# Patient Record
Sex: FEMALE | Race: BLACK OR AFRICAN AMERICAN | Employment: PART TIME
[De-identification: names, ages, dates, MRNs, and addresses within clinical notes are randomized per-mention and may not be internally consistent; named-entity substitution may affect disease eponyms.]

---
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
## 2023-07-10 ENCOUNTER — APPOINTMENT (OUTPATIENT)
Dept: GENERAL RADIOLOGY | Age: 18
End: 2023-07-10
Payer: COMMERCIAL

## 2023-07-10 ENCOUNTER — HOSPITAL ENCOUNTER (EMERGENCY)
Age: 18
Discharge: HOME | End: 2023-07-10
Attending: EMERGENCY MEDICINE
Payer: COMMERCIAL

## 2023-07-10 VITALS
OXYGEN SATURATION: 97 % | TEMPERATURE: 97.5 F | DIASTOLIC BLOOD PRESSURE: 81 MMHG | HEIGHT: 62 IN | HEART RATE: 81 BPM | BODY MASS INDEX: 29.13 KG/M2 | RESPIRATION RATE: 18 BRPM | WEIGHT: 158.29 LBS | SYSTOLIC BLOOD PRESSURE: 124 MMHG

## 2023-07-10 DIAGNOSIS — R42 DIZZINESS: Primary | ICD-10-CM

## 2023-07-10 LAB
ALBUMIN SERPL-MCNC: 4.5 G/DL (ref 3.5–5.2)
ALBUMIN/GLOB SERPL: 2 {RATIO} (ref 1–2.5)
ALP SERPL-CCNC: 82 U/L (ref 35–104)
ALT SERPL-CCNC: 19 U/L (ref 5–33)
ANION GAP SERPL CALCULATED.3IONS-SCNC: 12 MMOL/L (ref 9–17)
AST SERPL-CCNC: 18 U/L
BASOPHILS # BLD: 0.04 K/UL (ref 0–0.2)
BASOPHILS NFR BLD: 1 % (ref 0–2)
BILIRUB SERPL-MCNC: 0.6 MG/DL (ref 0.3–1.2)
BILIRUB UR QL STRIP: ABNORMAL
BUN SERPL-MCNC: 12 MG/DL (ref 6–20)
CALCIUM SERPL-MCNC: 9.7 MG/DL (ref 8.6–10.4)
CASTS #/AREA URNS LPF: NORMAL /LPF (ref 0–8)
CHLORIDE SERPL-SCNC: 102 MMOL/L (ref 98–107)
CLARITY UR: ABNORMAL
CO2 SERPL-SCNC: 22 MMOL/L (ref 20–31)
COLOR UR: ABNORMAL
CREAT SERPL-MCNC: 0.7 MG/DL (ref 0.5–0.9)
D DIMER PPP FEU-MCNC: 0.31 UG/ML FEU (ref 0–0.57)
EOSINOPHIL # BLD: 0.04 K/UL (ref 0–0.44)
EOSINOPHILS RELATIVE PERCENT: 1 % (ref 1–4)
EPI CELLS #/AREA URNS HPF: NORMAL /HPF (ref 0–5)
ERYTHROCYTE [DISTWIDTH] IN BLOOD BY AUTOMATED COUNT: 12.1 % (ref 11.8–14.4)
GFR SERPL CREATININE-BSD FRML MDRD: >60 ML/MIN/1.73M2
GLUCOSE SERPL-MCNC: 115 MG/DL (ref 70–99)
GLUCOSE UR STRIP-MCNC: NEGATIVE MG/DL
HCG SERPL QL: NEGATIVE
HCT VFR BLD AUTO: 38.9 % (ref 36.3–47.1)
HGB BLD-MCNC: 13.7 G/DL (ref 11.9–15.1)
HGB UR QL STRIP.AUTO: ABNORMAL
IMM GRANULOCYTES # BLD AUTO: 0.03 K/UL (ref 0–0.3)
IMM GRANULOCYTES NFR BLD: 1 %
KETONES UR STRIP-MCNC: NEGATIVE MG/DL
LEUKOCYTE ESTERASE UR QL STRIP: ABNORMAL
LIPASE SERPL-CCNC: 18 U/L (ref 13–60)
LYMPHOCYTES # BLD: 32 % (ref 25–45)
LYMPHOCYTES NFR BLD: 1.24 K/UL (ref 1.2–5.2)
MCH RBC QN AUTO: 28.7 PG (ref 25.2–33.5)
MCHC RBC AUTO-ENTMCNC: 35.2 G/DL (ref 28.4–34.8)
MCV RBC AUTO: 81.4 FL (ref 82.6–102.9)
MONOCYTES NFR BLD: 0.32 K/UL (ref 0.1–1.4)
MONOCYTES NFR BLD: 8 % (ref 2–8)
NEUTROPHILS NFR BLD: 58 % (ref 34–64)
NEUTS SEG NFR BLD: 2.27 K/UL (ref 1.8–8)
NITRITE UR QL STRIP: NEGATIVE
NRBC BLD-RTO: 0 PER 100 WBC
PH UR STRIP: 6.5 [PH] (ref 5–8)
PLATELET # BLD AUTO: ABNORMAL K/UL (ref 138–453)
PLATELET, FLUORESCENCE: ABNORMAL K/UL (ref 138–453)
POTASSIUM SERPL-SCNC: 4 MMOL/L (ref 3.7–5.3)
PROT SERPL-MCNC: 6.8 G/DL (ref 6.4–8.3)
PROT UR STRIP-MCNC: ABNORMAL MG/DL
RBC # BLD AUTO: 4.78 M/UL (ref 3.95–5.11)
RBC # BLD: ABNORMAL 10*6/UL
RBC #/AREA URNS HPF: NORMAL /HPF (ref 0–4)
SODIUM SERPL-SCNC: 136 MMOL/L (ref 135–144)
SP GR UR STRIP: 1.02 (ref 1–1.03)
TROPONIN I SERPL HS-MCNC: <6 NG/L (ref 0–14)
UROBILINOGEN UR STRIP-ACNC: NORMAL
WBC #/AREA URNS HPF: NORMAL /HPF (ref 0–5)
WBC OTHER # BLD: 3.9 K/UL (ref 4.5–13.5)

## 2023-07-10 PROCEDURE — 93005 ELECTROCARDIOGRAM TRACING: CPT | Performed by: EMERGENCY MEDICINE

## 2023-07-10 PROCEDURE — 80053 COMPREHEN METABOLIC PANEL: CPT

## 2023-07-10 PROCEDURE — 81001 URINALYSIS AUTO W/SCOPE: CPT

## 2023-07-10 PROCEDURE — 85027 COMPLETE CBC AUTOMATED: CPT

## 2023-07-10 PROCEDURE — 2580000003 HC RX 258: Performed by: EMERGENCY MEDICINE

## 2023-07-10 PROCEDURE — 99285 EMERGENCY DEPT VISIT HI MDM: CPT

## 2023-07-10 PROCEDURE — 83690 ASSAY OF LIPASE: CPT

## 2023-07-10 PROCEDURE — 96360 HYDRATION IV INFUSION INIT: CPT

## 2023-07-10 PROCEDURE — 84703 CHORIONIC GONADOTROPIN ASSAY: CPT

## 2023-07-10 PROCEDURE — 6370000000 HC RX 637 (ALT 250 FOR IP): Performed by: EMERGENCY MEDICINE

## 2023-07-10 PROCEDURE — 84484 ASSAY OF TROPONIN QUANT: CPT

## 2023-07-10 PROCEDURE — 71046 X-RAY EXAM CHEST 2 VIEWS: CPT

## 2023-07-10 PROCEDURE — 85379 FIBRIN DEGRADATION QUANT: CPT

## 2023-07-10 PROCEDURE — 85055 RETICULATED PLATELET ASSAY: CPT

## 2023-07-10 RX ORDER — 0.9 % SODIUM CHLORIDE 0.9 %
500 INTRAVENOUS SOLUTION INTRAVENOUS ONCE
Status: COMPLETED | OUTPATIENT
Start: 2023-07-10 | End: 2023-07-10

## 2023-07-10 RX ORDER — CETIRIZINE HYDROCHLORIDE 10 MG/1
10 TABLET ORAL DAILY
Qty: 30 TABLET | Refills: 0 | Status: SHIPPED | OUTPATIENT
Start: 2023-07-10

## 2023-07-10 RX ORDER — AZITHROMYCIN 250 MG/1
TABLET, FILM COATED ORAL
Qty: 1 PACKET | Refills: 0 | Status: SHIPPED | OUTPATIENT
Start: 2023-07-10 | End: 2023-07-14

## 2023-07-10 RX ORDER — MECLIZINE HCL 12.5 MG/1
12.5 TABLET ORAL ONCE
Status: COMPLETED | OUTPATIENT
Start: 2023-07-10 | End: 2023-07-10

## 2023-07-10 RX ORDER — MECLIZINE HCL 12.5 MG/1
12.5 TABLET ORAL 3 TIMES DAILY PRN
Qty: 15 TABLET | Refills: 0 | Status: SHIPPED | OUTPATIENT
Start: 2023-07-10 | End: 2023-07-15

## 2023-07-10 RX ADMIN — SODIUM CHLORIDE 500 ML: 0.9 INJECTION, SOLUTION INTRAVENOUS at 13:10

## 2023-07-10 RX ADMIN — MECLIZINE 12.5 MG: 12.5 TABLET ORAL at 13:37

## 2023-07-10 ASSESSMENT — PAIN SCALES - GENERAL
PAINLEVEL_OUTOF10: 7
PAINLEVEL_OUTOF10: 6

## 2023-07-10 ASSESSMENT — ENCOUNTER SYMPTOMS
ABDOMINAL PAIN: 1
NAUSEA: 0
SHORTNESS OF BREATH: 1
COUGH: 0
VOMITING: 0

## 2023-07-10 ASSESSMENT — PAIN - FUNCTIONAL ASSESSMENT: PAIN_FUNCTIONAL_ASSESSMENT: 0-10

## 2023-07-10 ASSESSMENT — PAIN DESCRIPTION - LOCATION
LOCATION: ABDOMEN;HEAD
LOCATION: HEAD;ABDOMEN

## 2023-07-10 ASSESSMENT — PAIN DESCRIPTION - DESCRIPTORS: DESCRIPTORS: ACHING;DISCOMFORT

## 2023-07-10 NOTE — ED PROVIDER NOTES
[address] [place] [address] ED  Emergency Department Encounter  Emergency Medicine Resident     Pt Clayton Nj  MRN: [id number]  [facility] 2005  Date of evaluation: 7/10/23  PCP:  Hussein Up MD  Note Started: 1:01 PM EDT      CHIEF COMPLAINT       Chief Complaint   Patient presents with    Dizziness    Headache    Shortness of Breath     Believes she is having an allergic reaction to amoxicillin       HISTORY OF PRESENT ILLNESS  (Location/Symptom, Timing/Onset, Context/Setting, Quality, Duration, Modifying Factors, Severity.)      Deidre Panchal is a 19 y.o. female who presents with multiple complaints including dizziness, headache, shortness of breath. Patient states this been ongoing for over a week. Patient states the dizziness and lightheadedness started prior to the shortness of breath. Patient started on amoxicillin for bacterial sinusitis. Patient states the shortness of breath has progressed since starting the amoxicillin and she feels she may be having allergic reaction to amoxicillin. She denies any oral swelling, nausea or vomiting, denies any difficulty swallowing. Patient describes both vertiginous and presyncopal feeling. Denies any chest pain. Patient has a history of lupus. PAST MEDICAL / SURGICAL / SOCIAL / FAMILY HISTORY      has a past medical history of Asthma, Endometriosis, Lupus ([address]), and Rhinitis. has no past surgical history on file.       Social History     Socioeconomic History    Marital status: Single     Spouse name: Not on file    Number of children: Not on file    Years of education: Not on file    Highest education level: Not on file   Occupational History    Not on file   Tobacco Use    Smoking status: Never    Smokeless tobacco: Never   Substance and Sexual Activity    Alcohol use: No    Drug use: No    Sexual activity: Not on file   Other Topics Concern    Not on file   Social History Narrative    Not on file     Social Determinants of

## 2023-07-10 NOTE — DISCHARGE INSTRUCTIONS
You were seen in the ER today for your dizziness as well as abdominal pain. At this time, concern this is related to your sinus infection. We will discharge you home with a prescription for a Z-Carrington as well as a medication for dizziness called Antivert or meclizine. Additionally we will send you home with prescription for Zyrtec. Please take the Zyrtec daily to help with nasal congestion. This may help resolve the dizzy feeling. Additionally, please take the Z-Carrington if your symptoms do not improve within the next 3 to 5 days. Please return to the emergency department if you develop worsening dizziness, nausea and vomiting that does not resolve, chest pain, worsening shortness of breath, or any other concerning symptoms. Otherwise, please follow with your primary care provider within the next 3 days to be reassessed. PLEASE RETURN TO THE EMERGENCY DEPARTMENT IMMEDIATELY if you develop any concerning symptoms such as: chest pain, shortness of breath, feeling like your heart is racing, high fever not relieved by acetaminophen (Tylenol) and/or ibuprofen (Motrin / Advil), chills, persistent nausea and/or vomiting, loss of consciousness, numbness, weakness or tingling in the arms or legs or change in color of the extremities, changes in mental status, persistent or severe headache, blurry vision, loss of bladder / bowel control, unable to follow up with your physician, or other any other care or concern.

## 2023-07-11 LAB
EKG ATRIAL RATE: 80 BPM
EKG P AXIS: 41 DEGREES
EKG P-R INTERVAL: 150 MS
EKG Q-T INTERVAL: 376 MS
EKG QRS DURATION: 68 MS
EKG QTC CALCULATION (BAZETT): 433 MS
EKG R AXIS: -16 DEGREES
EKG T AXIS: 9 DEGREES
EKG VENTRICULAR RATE: 80 BPM

## 2023-07-24 ENCOUNTER — TELEPHONE (OUTPATIENT)
Dept: OBGYN | Age: 18
End: 2023-07-24

## 2023-07-24 NOTE — TELEPHONE ENCOUNTER
Spoke to Ozzie Emerson and informed her that the schedules were not in the computer past September and at this time they are full. She was given the phone number of other [facility] providers.

## 2023-07-24 NOTE — TELEPHONE ENCOUNTER
Pt mom angel called in requesting to schedule NP appt with dr Denisha Thomas. Doroteo Greek states she see dr Denisha Thomas  herself and her daughter is having the same issues she was pt has been on her cycle for 1 month straight.  Writer did advise mom we current do bre have any opening for any NP mom is wanting to know if dr Denisha Thomas has any recommendations for pts

## 2023-08-31 ENCOUNTER — TELEPHONE (OUTPATIENT)
Dept: ONCOLOGY | Age: 18
End: 2023-08-31

## 2023-08-31 ENCOUNTER — APPOINTMENT (OUTPATIENT)
Dept: ULTRASOUND IMAGING | Age: 18
End: 2023-08-31
Payer: COMMERCIAL

## 2023-08-31 ENCOUNTER — HOSPITAL ENCOUNTER (EMERGENCY)
Age: 18
Discharge: HOME | End: 2023-08-31
Attending: EMERGENCY MEDICINE
Payer: COMMERCIAL

## 2023-08-31 VITALS
OXYGEN SATURATION: 100 % | TEMPERATURE: 97.9 F | WEIGHT: 155 LBS | BODY MASS INDEX: 28.35 KG/M2 | DIASTOLIC BLOOD PRESSURE: 72 MMHG | HEART RATE: 72 BPM | RESPIRATION RATE: 18 BRPM | SYSTOLIC BLOOD PRESSURE: 123 MMHG

## 2023-08-31 DIAGNOSIS — N93.9 ABNORMAL UTERINE BLEEDING: Primary | ICD-10-CM

## 2023-08-31 DIAGNOSIS — R10.30 LOWER ABDOMINAL PAIN: ICD-10-CM

## 2023-08-31 LAB
ANION GAP SERPL CALCULATED.3IONS-SCNC: 15 MMOL/L (ref 9–17)
BASOPHILS # BLD: 0.06 K/UL (ref 0–0.2)
BASOPHILS NFR BLD: 1 % (ref 0–2)
BILIRUB UR QL STRIP: NEGATIVE
BUN SERPL-MCNC: 11 MG/DL (ref 6–20)
CALCIUM SERPL-MCNC: 9 MG/DL (ref 8.6–10.4)
CANDIDA SPECIES: NEGATIVE
CASTS #/AREA URNS LPF: NORMAL /LPF (ref 0–8)
CHLORIDE SERPL-SCNC: 106 MMOL/L (ref 98–107)
CLARITY UR: ABNORMAL
CO2 SERPL-SCNC: 16 MMOL/L (ref 20–31)
COLOR UR: ABNORMAL
CREAT SERPL-MCNC: 0.7 MG/DL (ref 0.5–0.9)
EOSINOPHIL # BLD: 0.07 K/UL (ref 0–0.44)
EOSINOPHILS RELATIVE PERCENT: 1 % (ref 1–4)
EPI CELLS #/AREA URNS HPF: NORMAL /HPF (ref 0–5)
ERYTHROCYTE [DISTWIDTH] IN BLOOD BY AUTOMATED COUNT: 11.9 % (ref 11.8–14.4)
GARDNERELLA VAGINALIS: POSITIVE
GFR SERPL CREATININE-BSD FRML MDRD: >60 ML/MIN/1.73M2
GLUCOSE SERPL-MCNC: 164 MG/DL (ref 70–99)
GLUCOSE UR STRIP-MCNC: NEGATIVE MG/DL
HCG SERPL QL: NEGATIVE
HCT VFR BLD AUTO: 38.9 % (ref 36.3–47.1)
HGB BLD-MCNC: 13.4 G/DL (ref 11.9–15.1)
HGB UR QL STRIP.AUTO: ABNORMAL
IMM GRANULOCYTES # BLD AUTO: <0.03 K/UL (ref 0–0.3)
IMM GRANULOCYTES NFR BLD: 0 %
KETONES UR STRIP-MCNC: ABNORMAL MG/DL
LEUKOCYTE ESTERASE UR QL STRIP: ABNORMAL
LYMPHOCYTES NFR BLD: 1.19 K/UL (ref 1.2–5.2)
LYMPHOCYTES RELATIVE PERCENT: 24 % (ref 25–45)
MAGNESIUM SERPL-MCNC: 1.7 MG/DL (ref 1.6–2.6)
MCH RBC QN AUTO: 29.2 PG (ref 25.2–33.5)
MCHC RBC AUTO-ENTMCNC: 34.4 G/DL (ref 28.4–34.8)
MCV RBC AUTO: 84.7 FL (ref 82.6–102.9)
MONOCYTES NFR BLD: 0.41 K/UL (ref 0.1–1.4)
MONOCYTES NFR BLD: 8 % (ref 2–8)
NEUTROPHILS NFR BLD: 66 % (ref 34–64)
NEUTS SEG NFR BLD: 3.27 K/UL (ref 1.8–8)
NITRITE UR QL STRIP: NEGATIVE
NRBC BLD-RTO: 0 PER 100 WBC
PH UR STRIP: 5.5 [PH] (ref 5–8)
PLATELET # BLD AUTO: 227 K/UL (ref 138–453)
PMV BLD AUTO: 9.3 FL (ref 8.1–13.5)
POTASSIUM SERPL-SCNC: 3.8 MMOL/L (ref 3.7–5.3)
PROT UR STRIP-MCNC: ABNORMAL MG/DL
RBC # BLD AUTO: 4.59 M/UL (ref 3.95–5.11)
RBC #/AREA URNS HPF: NORMAL /HPF (ref 0–4)
SODIUM SERPL-SCNC: 137 MMOL/L (ref 135–144)
SOURCE: ABNORMAL
SP GR UR STRIP: 1.01 (ref 1–1.03)
TRICHOMONAS: NEGATIVE
UROBILINOGEN UR STRIP-ACNC: NORMAL EU/DL (ref 0–1)
WBC #/AREA URNS HPF: NORMAL /HPF (ref 0–5)
WBC OTHER # BLD: 5 K/UL (ref 4.5–13.5)

## 2023-08-31 PROCEDURE — 80048 BASIC METABOLIC PNL TOTAL CA: CPT

## 2023-08-31 PROCEDURE — 85025 COMPLETE CBC W/AUTO DIFF WBC: CPT

## 2023-08-31 PROCEDURE — 87660 TRICHOMONAS VAGIN DIR PROBE: CPT

## 2023-08-31 PROCEDURE — 99284 EMERGENCY DEPT VISIT MOD MDM: CPT

## 2023-08-31 PROCEDURE — 76830 TRANSVAGINAL US NON-OB: CPT

## 2023-08-31 PROCEDURE — 6370000000 HC RX 637 (ALT 250 FOR IP): Performed by: STUDENT IN AN ORGANIZED HEALTH CARE EDUCATION/TRAINING PROGRAM

## 2023-08-31 PROCEDURE — 87480 CANDIDA DNA DIR PROBE: CPT

## 2023-08-31 PROCEDURE — 81001 URINALYSIS AUTO W/SCOPE: CPT

## 2023-08-31 PROCEDURE — 93975 VASCULAR STUDY: CPT

## 2023-08-31 PROCEDURE — 96375 TX/PRO/DX INJ NEW DRUG ADDON: CPT

## 2023-08-31 PROCEDURE — 83735 ASSAY OF MAGNESIUM: CPT

## 2023-08-31 PROCEDURE — 87086 URINE CULTURE/COLONY COUNT: CPT

## 2023-08-31 PROCEDURE — 84703 CHORIONIC GONADOTROPIN ASSAY: CPT

## 2023-08-31 PROCEDURE — 96374 THER/PROPH/DIAG INJ IV PUSH: CPT

## 2023-08-31 PROCEDURE — 2580000003 HC RX 258: Performed by: STUDENT IN AN ORGANIZED HEALTH CARE EDUCATION/TRAINING PROGRAM

## 2023-08-31 PROCEDURE — 6360000002 HC RX W HCPCS: Performed by: STUDENT IN AN ORGANIZED HEALTH CARE EDUCATION/TRAINING PROGRAM

## 2023-08-31 PROCEDURE — 87591 N.GONORRHOEAE DNA AMP PROB: CPT

## 2023-08-31 PROCEDURE — 87491 CHLMYD TRACH DNA AMP PROBE: CPT

## 2023-08-31 PROCEDURE — 87510 GARDNER VAG DNA DIR PROBE: CPT

## 2023-08-31 RX ORDER — MORPHINE SULFATE 4 MG/ML
4 INJECTION INTRAVENOUS ONCE
Status: COMPLETED | OUTPATIENT
Start: 2023-08-31 | End: 2023-08-31

## 2023-08-31 RX ORDER — TRANEXAMIC ACID 650 MG/1
650 TABLET ORAL 2 TIMES DAILY
Qty: 10 TABLET | Refills: 0 | Status: SHIPPED | OUTPATIENT
Start: 2023-08-31 | End: 2023-09-05

## 2023-08-31 RX ORDER — TRANEXAMIC ACID 650 MG/1
650 TABLET ORAL ONCE
Status: COMPLETED | OUTPATIENT
Start: 2023-08-31 | End: 2023-08-31

## 2023-08-31 RX ORDER — 0.9 % SODIUM CHLORIDE 0.9 %
1000 INTRAVENOUS SOLUTION INTRAVENOUS ONCE
Status: COMPLETED | OUTPATIENT
Start: 2023-08-31 | End: 2023-08-31

## 2023-08-31 RX ORDER — KETOROLAC TROMETHAMINE 10 MG/1
10 TABLET, FILM COATED ORAL EVERY 6 HOURS PRN
Qty: 10 TABLET | Refills: 0 | Status: SHIPPED | OUTPATIENT
Start: 2023-08-31 | End: 2024-09-05

## 2023-08-31 RX ORDER — KETOROLAC TROMETHAMINE 30 MG/ML
30 INJECTION, SOLUTION INTRAMUSCULAR; INTRAVENOUS ONCE
Status: COMPLETED | OUTPATIENT
Start: 2023-08-31 | End: 2023-08-31

## 2023-08-31 RX ADMIN — KETOROLAC TROMETHAMINE 30 MG: 30 INJECTION, SOLUTION INTRAMUSCULAR; INTRAVENOUS at 13:54

## 2023-08-31 RX ADMIN — MORPHINE SULFATE 4 MG: 4 INJECTION INTRAVENOUS at 10:27

## 2023-08-31 RX ADMIN — SODIUM CHLORIDE 1000 ML: 9 INJECTION, SOLUTION INTRAVENOUS at 10:29

## 2023-08-31 RX ADMIN — TRANEXAMIC ACID 650 MG: 650 TABLET ORAL at 13:56

## 2023-08-31 ASSESSMENT — ENCOUNTER SYMPTOMS
DIARRHEA: 0
SHORTNESS OF BREATH: 0
RHINORRHEA: 0
NAUSEA: 1
EYE REDNESS: 0
VOMITING: 1
ABDOMINAL PAIN: 1

## 2023-08-31 ASSESSMENT — PAIN SCALES - GENERAL
PAINLEVEL_OUTOF10: 7
PAINLEVEL_OUTOF10: 8

## 2023-08-31 ASSESSMENT — PAIN - FUNCTIONAL ASSESSMENT: PAIN_FUNCTIONAL_ASSESSMENT: 0-10

## 2023-08-31 NOTE — DISCHARGE INSTRUCTIONS
Please take the Lysteda twice daily for 5 days. Return to the emergency department if you develop bleeding, soaking more than 1 pad an hour. If given narcotics (opiates) during this Emergency Department visit, please do not drink, drive or operate any machinery for at least 4 - 6 hours. For pain:  Tylenol can be taken every 6 hours. Toradol can be taken every 6 hours. It is recommended you alternate the two every three hours. Example pain medication schedule:  - 9am: Tylenol (500-1000mg)  - 12 pm/noon: Toradol (10mg)  - 3pm: Tylenol  - 6pm: Toradol    Maximum dose of tylenol in a 24 hour period is 4000mg. Do not take Toradol for more than 3 days in a row. Do not use ibuprofen, Aleve or other NSAIDs while taking the Toradol as this can cause stomach ulcers. PLEASE RETURN TO THE EMERGENCY DEPARTMENT IMMEDIATELY for worsening symptoms, or if you develop any concerning symptoms such as: high fever not relieved by acetaminophen (Tylenol) and/or ibuprofen (Motrin), chills, shortness of breath, chest pain, persistent nausea and/or vomiting, numbness, weakness or tingling in the arms or legs or change in color of the extremities, changes in mental status, persistent headache, blurry vision. Return within 8 - 12 hours if you have any of the following: worsening of pain in your abdomen, no food sounds good to you, you continue to vomit, pain goes to your back, have pain in the abdomen when going over a bump in the car or when you jump up and down, develop vaginal bleeding or discharge, inability to urinate, unable to follow up with your physician, or other any other care or concern.

## 2023-08-31 NOTE — ED NOTES
Patient presents to the ED via EMS with c/o pelvic and abd pain. Patient reports that it started this morning after she had woken up with vaginal bleeding. Patient reports hx of this pelvic pain and bleeding states it was ongoing for several months from June until August 1st. Patient states she has been seen by her OB who had performed multiple tests but was unable to identify and problems. Patient states her periods are irregular. EMS reports administering nitrous oxide and NS bolus. Patient is alert and oriented x4, answering questions appropriately. Patient is changed into a gown, placed on cardiac monitor, IV established, will continue plan of care.         Haylie High RN  08/31/23 [id number]

## 2023-08-31 NOTE — ED PROVIDER NOTES
[address] [place] [address] ED  Emergency Department Encounter  Emergency Medicine Resident     Pt Maggie Chan  MRN: [id number]  [address] [facility] 2005  Date of evaluation: 8/31/23  PCP:  Casie Up MD  Note Started: 10:06 AM EDT    CHIEF COMPLAINT       Chief Complaint   Patient presents with    Pelvic Pain    Abdominal Pain     HISTORY OF PRESENT ILLNESS  (Location/Symptom, Timing/Onset, Context/Setting, Quality, Duration, Modifying Factors, Severity.)      Sree Nichols is a 19 y.o. female who presents with lower abdominal pain/pelvic pain greatest in the right lower quadrant. Patient states that this pain started acutely this morning awaking her from sleep around 6:30 in the morning. She called EMS who transported her to the hospital.  Patient does have history of heavy menstrual cycles and uses TXA for the first 5 days of her menstrual as recommended by her OB/GYN and Dr. Lucina Tolentino from Hem/Onc. Patient states that she last used this in June. June 1 she started bleeding and bled through August 1. She was doing well up until today. Patient was told that she had a platelet disorder by the heme-onc doctor. Patient has trialed Nexplanon implant in the past to help with her menstrual cycles without success. She states that this actually made her cycles worse. PAST MEDICAL / SURGICAL / SOCIAL / FAMILY HISTORY      has a past medical history of Asthma, Endometriosis, Lupus ([address]), and Rhinitis. has no past surgical history on file.     Social History     Socioeconomic History    Marital status: Single     Spouse name: Not on file    Number of children: Not on file    Years of education: Not on file    Highest education level: Not on file   Occupational History    Not on file   Tobacco Use    Smoking status: Never    Smokeless tobacco: Never   Substance and Sexual Activity    Alcohol use: No    Drug use: No    Sexual activity: Not on file   Other Topics Concern    Not on file   Social torsion plus minus ectopic pending result of hCG. We will also perform pelvic examination to examine the cervical os and extent of vaginal bleeding. Consider starting TXA versus OCP to help manage cycles although patient was trialed on intradermal implant without success. Amount and/or Complexity of Data Reviewed  Labs: ordered. Decision-making details documented in ED Course. Radiology: ordered. Risk  Prescription drug management. EKG  none    All EKG's are interpreted by the Emergency Department Physician who either signs or Co-signs this chart in the absence of a cardiologist.    EMERGENCY DEPARTMENT COURSE:    ED Course as of 08/31/23 1732   Thu Aug 31, 2023   1037 Pelvic exam performed with nurse Cecil Boothe and ARNALDO Schultz as chaperones. Non parous cervix. Dark blood and clots seen in vaginal canal.  [CP]   1107 hCG Qual: NEGATIVE  No ectopic [CP]   1209 GARDNERELLA VAGINALIS(!): POSITIVE [CP]   2439 Updated patient and mother regarding test results. Attending contacted Dr. Cristine Acosta regarding future management. [CP]   1408 Microscopic Urinalysis:    WBC, UA 10 TO 20   RBC, UA TOO NUMEROUS TO COUNT   Casts UA 2 TO 5 HYALINE Reference range defined for non-centrifuged specimen. Epithelial Cells, UA 2 TO 5  No bacteria seen on microscopic. Updated patient on result of urine and vaginitis. Patient asymptomatic. Will not treat for BV at this time. Will await urine culture results prior to starting abx [CP]      ED Course User Index  [CP] Bess Yu MD     PROCEDURES:  none    CONSULTS:  None    CRITICAL CARE:  There was significant risk of life threatening deterioration of patient's condition requiring my direct management. Critical care time 0 minutes, excluding any documented procedures. FINAL IMPRESSION      1. Abnormal uterine bleeding    2.  Lower abdominal pain        DISPOSITION / PLAN     DISPOSITION Decision To Discharge 08/31/2023 01:40:55 PM      PATIENT REFERRED TO:  Sera Navas

## 2023-08-31 NOTE — TELEPHONE ENCOUNTER
Pt's mother called stating that pt called office and was told no longer taking [employer] Medicaid and pt stated that pt had [employer] through [address] Was told that Dr Chaya Chandra didn't take any [employer]. Writer apologized to mother and looked into pt's chart. Pt is established with Dr Chaya Chandra and does have [employer] listed as primary insurance. Was able to schedule pt a virtual appt with Dr Keshawn Diamond on 09/05/2023 at 4:15. Mother and pt were relieved to be able to have an appt scheduled. Writer did apologized again.     Electronically signed by Leonela Oliva on 8/31/2023 at 9:19 AM

## 2023-09-01 LAB
C TRACH DNA SPEC QL PROBE+SIG AMP: NEGATIVE
MICROORGANISM SPEC CULT: NORMAL
N GONORRHOEA DNA SPEC QL PROBE+SIG AMP: NEGATIVE
SPECIMEN DESCRIPTION: NORMAL
SPECIMEN DESCRIPTION: NORMAL

## 2023-09-05 ENCOUNTER — TELEMEDICINE (OUTPATIENT)
Dept: ONCOLOGY | Age: 18
End: 2023-09-05
Payer: COMMERCIAL

## 2023-09-05 DIAGNOSIS — D69.1 PLATELET DYSFUNCTION (HCC): ICD-10-CM

## 2023-09-05 DIAGNOSIS — M32.9 LUPUS (HCC): ICD-10-CM

## 2023-09-05 DIAGNOSIS — D69.9 BLEEDING TENDENCY (HCC): Primary | ICD-10-CM

## 2023-09-05 PROCEDURE — 99214 OFFICE O/P EST MOD 30 MIN: CPT | Performed by: INTERNAL MEDICINE

## 2023-09-05 RX ORDER — RIBOFLAVIN (VITAMIN B2) 400 MG
1 TABLET ORAL EVERY MORNING
COMMUNITY
Start: 2023-08-23

## 2023-09-05 RX ORDER — MAGNESIUM OXIDE 400 MG/1
1 TABLET ORAL EVERY MORNING
COMMUNITY
Start: 2023-08-23

## 2023-09-05 RX ORDER — SUMATRIPTAN 100 MG/1
TABLET, FILM COATED ORAL
COMMUNITY
Start: 2023-08-30

## 2023-09-05 NOTE — PROGRESS NOTES
DIAGNOSIS:   Qualitative platelet dysfunction     CURRENT THERAPY:  Surveillance     BRIEF CASE HISTORY:   Rashmi Fuentes is a very pleasant 19 y.o. female who is referred to us for possible bleeding tendency. She has history of lupus and is currently taking Methotrexate. She has been experiencing worsening menorrhagia since starting Methotrexate, she has had bleeding for the past 6 weeks. Her mother has history of anemia and also has lupus nephritis. She has gynecology follow up next with US planned to assess for fibroids. She is not taking any aspirin, tylenol, or ibuprofen. She has history of asthma that is currently well controlled with medication. She denies prolonged bleeding or spontaneous bruising, no bleeding with surgery and no history of transfusion. In 06/2022 she was found to have idiopathic intracranial hypertension and started on medication. She had EGD and colonoscopy 06/2022, polyps found. The patient was diagnosed with idiopathic intracranial hypertension and was started on Diamox with improvement. After that the Diamox was stopped. She was referred to [facility] clinic rheumatology after stopping her methotrexate. They are repeating the work-up to confirm her rheumatologic disorder. INTERIM HISTORY:   This is a virtual visit. The patient developed severe menorrhagia that lasted almost 2 months. She was even seen at [facility] clinic rheumatology and then sent to Dr. Rusty Rodriguez at heme-onc at [facility]. Extensive work-up was done and showed also qualitative platelet dysfunction. Von Willebrand testing was on the low normal range  PAST MEDICAL HISTORY: has a past medical history of Asthma, Endometriosis, Lupus ([address]), and Rhinitis. PAST SURGICAL HISTORY: has no past surgical history on file.      CURRENT MEDICATIONS:  has a current medication list which includes the following prescription(s): tranexamic acid, ketorolac, cetirizine, amitriptyline, ferrous sulfate, tranexamic

## 2023-09-08 ENCOUNTER — TELEPHONE (OUTPATIENT)
Dept: OBGYN | Age: 18
End: 2023-09-08

## 2023-09-08 NOTE — TELEPHONE ENCOUNTER
----- Message from Clement Crespo DO sent at 9/7/2023  8:40 PM EDT -----  Regarding: FW: Need for follow up on AUB pt  Can we get this patient scheduled on a Wednesday? Thanks!     Kenyabest  ----- Message -----  From: Nasim William MD  Sent: 8/31/2023   9:71 PM EDT  To: Clement Crespo DO  Subject: Need for follow up on AUB pt                     Juwan Marks,    I saw this lady in the ED today. She has had some AUB issues, with episodes lasting over a month at a time, prior use of TXA. She reports previously she had diagnostic laparoscopy for concerns of endometriosis although I can't find any procedural note in our records on those results. She came in with an acute episode of pelvic pain today that we worked up to ensure no torsion, labs fine. She has been attempting to establish with you specifically as her GYN as you see her mother for similar issues. Looking at the notes they've had an issue getting in schedule wise. I wanted to put her on your radar so if you have some availability you can get her in.     Thanks,  Devante De Paz

## 2024-04-18 ENCOUNTER — TELEMEDICINE (OUTPATIENT)
Dept: ONCOLOGY | Age: 19
End: 2024-04-18
Payer: COMMERCIAL

## 2024-04-18 DIAGNOSIS — N92.1 MENORRHAGIA WITH IRREGULAR CYCLE: ICD-10-CM

## 2024-04-18 DIAGNOSIS — D69.1 PLATELET DYSFUNCTION (HCC): ICD-10-CM

## 2024-04-18 DIAGNOSIS — D69.9 BLEEDING TENDENCY (HCC): Primary | ICD-10-CM

## 2024-04-18 PROCEDURE — 99214 OFFICE O/P EST MOD 30 MIN: CPT | Performed by: INTERNAL MEDICINE

## 2024-04-18 RX ORDER — TRANEXAMIC ACID 650 MG/1
TABLET ORAL
Qty: 40 TABLET | Refills: 3 | Status: SHIPPED | OUTPATIENT
Start: 2024-04-18

## 2024-04-18 NOTE — PROGRESS NOTES
cetirizine, ferrous sulfate, tranexamic acid, budesonide-formoterol, vitamin d, albuterol sulfate hfa, fluticasone, amitriptyline, acetazolamide, folic acid, methotrexate, NONFORMULARY, montelukast, beclomethasone, montelukast, and vortex holding chamber/mask.    ALLERGIES:  is allergic to amoxicillin and hydroxychloroquine.    FAMILY HISTORY: Negative for any hematological or oncological conditions.     SOCIAL HISTORY:  reports that she has never smoked. She has never used smokeless tobacco. She reports that she does not drink alcohol and does not use drugs.    REVIEW OF SYSTEMS:   General: No fever or night sweats. Weight is stable.  ENT: No double or blurred vision, no tinnitus or hearing problem, no dysphagia or sore throat   Respiratory: No chest pain, no shortness of breath, no cough or hemoptysis.  Cardiovascular: Denies chest pain, PND or orthopnea. No L E swelling or palpitations.  Gastrointestinal: No nausea or vomiting, abdominal pain, diarrhea or constipation.   Genitourinary: Denies dysuria, hematuria, frequency, urgency or incontinence.  Continuous  menstruation for the last 5 weeks  Neurological: Denies headaches, decreased LOC, no sensory or motor focal deficits.  Musculoskeletal: No arthralgia no back pain or joint swelling.   Skin: There are no rashes or bleeding.  Psychiatric: No anxiety, no depression.   Endocrine: No diabetes or thyroid disease.   Hematologic: No bleeding, no adenopathy.    PHYSICAL EXAM: Shows a well appearing 19 y.o.-year-old female who is not in pain or distress.  PHYSICAL EXAMINATION:    Vital Signs: (As obtained by patient/caregiver or practitioner observation)    Blood pressure-  Heart rate-    Respiratory rate-    Temperature-  Pulse oximetry-     Constitutional: [x] Appears well-developed and well-nourished [x] No apparent distress      [] Abnormal-   Mental status  [x] Alert and awake  [x] Oriented to person/place/time [x]Able to follow commands      Eyes:  EOM    [x]

## 2024-04-25 ENCOUNTER — TELEPHONE (OUTPATIENT)
Dept: ONCOLOGY | Age: 19
End: 2024-04-25

## 2024-04-25 NOTE — TELEPHONE ENCOUNTER
Late entry         Refer to Dr. Mcgowan  Continue with tranexamic acid  Schedule virtual visit in 3 months        Faxed referral over to Dr. Aaron office. Did let patient know on voicemail if she does not hear anything to call the office. Also scheduled patient for a virtual appointment on 7/16.     LVM for patient to let her know if she has any questions to call the office.

## 2024-07-18 ENCOUNTER — TELEPHONE (OUTPATIENT)
Dept: ONCOLOGY | Age: 19
End: 2024-07-18

## 2024-07-19 ENCOUNTER — TELEPHONE (OUTPATIENT)
Dept: ONCOLOGY | Age: 19
End: 2024-07-19